# Patient Record
Sex: FEMALE | URBAN - METROPOLITAN AREA
[De-identification: names, ages, dates, MRNs, and addresses within clinical notes are randomized per-mention and may not be internally consistent; named-entity substitution may affect disease eponyms.]

---

## 2019-07-29 ENCOUNTER — COMMUNICATION - HEALTHEAST (OUTPATIENT)
Dept: SCHEDULING | Facility: CLINIC | Age: 44
End: 2019-07-29

## 2021-05-30 NOTE — TELEPHONE ENCOUNTER
"Nephew calling - no consent on file - unable to triage.  States  patient is an alcoholic. \"She is out of it.\"  \"She's coughing up blood.\"    Advised caller to call 911 if urgent medical care is needed.  Caller declines - states he is heading to [facility] emergency room with the patient and was only calling to let them know they are on the way.    Patient does not have [facility] PCP.    Luisa Reyes RN  Triage Nurse Advisor        "